# Patient Record
Sex: FEMALE | Race: WHITE | HISPANIC OR LATINO | URBAN - METROPOLITAN AREA
[De-identification: names, ages, dates, MRNs, and addresses within clinical notes are randomized per-mention and may not be internally consistent; named-entity substitution may affect disease eponyms.]

---

## 2020-05-27 ENCOUNTER — EMERGENCY (EMERGENCY)
Facility: HOSPITAL | Age: 45
LOS: 1 days | Discharge: HOME | End: 2020-05-27
Attending: EMERGENCY MEDICINE | Admitting: EMERGENCY MEDICINE
Payer: COMMERCIAL

## 2020-05-27 VITALS
OXYGEN SATURATION: 100 % | RESPIRATION RATE: 16 BRPM | TEMPERATURE: 98 F | HEART RATE: 89 BPM | DIASTOLIC BLOOD PRESSURE: 90 MMHG | SYSTOLIC BLOOD PRESSURE: 146 MMHG

## 2020-05-27 PROCEDURE — 99284 EMERGENCY DEPT VISIT MOD MDM: CPT | Mod: 25

## 2020-05-27 PROCEDURE — 70450 CT HEAD/BRAIN W/O DYE: CPT | Mod: 26

## 2020-05-27 PROCEDURE — 12001 RPR S/N/AX/GEN/TRNK 2.5CM/<: CPT

## 2020-05-27 PROCEDURE — 72125 CT NECK SPINE W/O DYE: CPT | Mod: 26

## 2020-05-27 RX ORDER — ACETAMINOPHEN 500 MG
975 TABLET ORAL ONCE
Refills: 0 | Status: COMPLETED | OUTPATIENT
Start: 2020-05-27 | End: 2020-05-27

## 2020-05-27 RX ORDER — TETANUS TOXOID, REDUCED DIPHTHERIA TOXOID AND ACELLULAR PERTUSSIS VACCINE, ADSORBED 5; 2.5; 8; 8; 2.5 [IU]/.5ML; [IU]/.5ML; UG/.5ML; UG/.5ML; UG/.5ML
0.5 SUSPENSION INTRAMUSCULAR ONCE
Refills: 0 | Status: COMPLETED | OUTPATIENT
Start: 2020-05-27 | End: 2020-05-27

## 2020-05-27 RX ORDER — ONDANSETRON 8 MG/1
4 TABLET, FILM COATED ORAL ONCE
Refills: 0 | Status: COMPLETED | OUTPATIENT
Start: 2020-05-27 | End: 2020-05-27

## 2020-05-27 RX ADMIN — TETANUS TOXOID, REDUCED DIPHTHERIA TOXOID AND ACELLULAR PERTUSSIS VACCINE, ADSORBED 0.5 MILLILITER(S): 5; 2.5; 8; 8; 2.5 SUSPENSION INTRAMUSCULAR at 22:05

## 2020-05-27 RX ADMIN — Medication 975 MILLIGRAM(S): at 22:05

## 2020-05-27 RX ADMIN — ONDANSETRON 4 MILLIGRAM(S): 8 TABLET, FILM COATED ORAL at 23:03

## 2020-05-27 NOTE — ED ADULT TRIAGE NOTE - CHIEF COMPLAINT QUOTE
trip and fall after the shower, patient admits to drinking 3 beers tonight, hit head on bed, laceration present on left side of head, bleeding controlled by pressure. patient denies LOC/ dizziness prior to fall. pt on tamoxifen for breast CA.

## 2020-05-27 NOTE — ED PROVIDER NOTE - ATTENDING CONTRIBUTION TO CARE
MD Peña:  patient seen and evaluated with the resident.  I was present for key portions of the History & Physical, and I agree with the Impression & Plan.  MD Peña:  43 yo F, c/o scalp laceration.  Onset:  1 hr PTA.  Context:  ETOH intox tonight (4 beers), was trying to put on slippers and hit L parietal side of skull.  Associated Sx: no LOC, no HA, no neck pain, no back pain.  no blurry vision/double vision.  VS: wnl.  Physical Exam: adult F, NAD, 1cm laceration R parietal aspect scalp, PERRL, EOMI, neck supple, and w/o midline TTP, RRR, CTA B, Abd: s/nd/nt, Ext: no edema, Neuro: AAOx3, ambulates w/o diff, strength 5/5 & symmetric throughout.  Impression:  head injury + scalp laceration.  injury due to ETOH intox, but calm, cooperative, AAOx3.  Plan:  CT head, wound care.

## 2020-05-27 NOTE — ED PROVIDER NOTE - CARE PLAN
Principal Discharge DX:	Scalp laceration, initial encounter  Goal:	1.5cm, linear Principal Discharge DX:	Scalp laceration, initial encounter  Goal:	1.5cm, linear  Secondary Diagnosis:	Thyroid nodule

## 2020-05-27 NOTE — ED PROVIDER NOTE - NSFOLLOWUPINSTRUCTIONS_ED_ALL_ED_FT
YOU WERE SEEN IN THE ED FOR: SCALP LACERATION    PLEASE RETURN TO THE ED OR SEE YOUR PRIMARY DOCTOR IN 7 DAYS TO HAVE THE WOUND CHECKED AND STAPLES REMOVED.    FOR PAIN/FEVER, YOU MAY TAKE TYLENOL (acetaminophen). FOLLOW THE INSTRUCTIONS ON THE LABEL/CONTAINER.    PLEASE FOLLOW UP WITH YOUR PRIVATE PHYSICIAN WITHIN THE NEXT 72 HOURS. BRING COPIES OF YOUR RESULTS.  -If you do not have a PMD, please call [phone] to find one convenient for you or call our clinic at [phone].    RETURN TO THE EMERGENCY DEPARTMENT IF YOU EXPERIENCE ANY NEW/CONCERNING/WORSENING SYMPTOMS. YOU WERE SEEN IN THE ED FOR: SCALP LACERATION    YOU WERE FOUND TO HAVE AN INCIDENTAL THYROID NODULE. PLEASE FOLLOW UP WITH YOUR PRIMARY DOCTOR ABOUT THIS.    PLEASE RETURN TO THE ED OR SEE YOUR PRIMARY DOCTOR IN 7 DAYS TO HAVE THE WOUND CHECKED AND STAPLES REMOVED.    FOR PAIN/FEVER, YOU MAY TAKE TYLENOL (acetaminophen). FOLLOW THE INSTRUCTIONS ON THE LABEL/CONTAINER.    PLEASE FOLLOW UP WITH YOUR PRIVATE PHYSICIAN WITHIN THE NEXT 72 HOURS. BRING COPIES OF YOUR RESULTS.  -If you do not have a PMD, please call [phone] to find one convenient for you or call our clinic at [phone].    RETURN TO THE EMERGENCY DEPARTMENT IF YOU EXPERIENCE ANY NEW/CONCERNING/WORSENING SYMPTOMS.

## 2020-05-27 NOTE — ED PROVIDER NOTE - PATIENT PORTAL LINK FT
You can access the FollowMyHealth Patient Portal offered by [facility] by registering at the following website: [email or web address]. By joining [facility]’s FollowMyHealth portal, you will also be able to view your health information using other applications (apps) compatible with our system.

## 2020-05-27 NOTE — ED PROVIDER NOTE - OBJECTIVE STATEMENT
44 y.o. female hx of breast cancer on tamoxifen presents to the ED s/p unwitnessed mechanical fall 1hr PTA. Patient got out of her shower, walked to her bedroom, tried to put on her crocs and slipped. She fell backwards, and the hit the left side of her head on the ground. Denies LOC. Not on any anticoagulants, antiplatelets. Denies feeling dizzy, nauseous, CP, SOB numbness, tingling, weakness before the incident. Patient was able to stand up and walk afterwards. Patient endorses drinking 4 Corona's before the fall. Does not regularly drink alcohol. Not sure when last tetanus shot was.

## 2020-05-27 NOTE — ED PROVIDER NOTE - PROGRESS NOTE DETAILS
Yusuf Johsnton D.O., PGY1 (Resident)  Wound closed with 2 staples after copious irrigation and wound exploration in bloodless field. Hemostasis achieved.

## 2020-05-27 NOTE — ED PROVIDER NOTE - CLINICAL SUMMARY MEDICAL DECISION MAKING FREE TEXT BOX
Impression:  head injury + scalp laceration.  injury due to ETOH intox, but calm, cooperative, AAOx3.  Plan:  CT head, wound care.

## 2020-05-27 NOTE — ED PROVIDER NOTE - PHYSICAL EXAMINATION
GENERAL: middle aged female, lying in bed, NAD, active bleeding from scalp lac that was controlled with pressure bandage. Vital signs are within normal limits  HEENT: NC, 1.5cm linear scalp lac w/o exposed bone or foreign bodies. conjunctiva noninjected and sclera anicteric, moist mucous membranes,  NECK: Supple, trachea midline  LUNG: CTAB, no w/r/r appreciated  CV: RRR, no m/r/g appreciated, Pulses- Radial: 2+ b/l  ABDOMEN: Soft, NTND  BACK: No midline spinal tenderness or step-offs. No paraspinal tenderness to palpation  MSK: No edema, no visible deformities, full range of motion UE/LE b/l, 5/5 muscle strength UE/LE b/l, nontender extremities  NEURO: AAOx4 (to person, place, time, event), sensation grossly intact, finger-to-nose smooth and rapid, no pronator drift, steady gait  -Cranial Nerves:  --CN II: PERRL  --CN III, IV, VI: EOMI b/l  --CN V1-3: Facial sensation intact to touch  --CN VII: No facial asymmetry or droop  --CN VIII: Hearing intact to rubbing fingers b/l  --CN IX, X: Palate elevates symmetrically. Normal phonation  --CN XI: Heading turning and shoulder shrug intact b/l  --CN XII: Tongue midline with normal movements   SKIN: Warm, dry, well perfused, no evidence of rash  PSYCH: Normal mood and affect

## 2020-05-27 NOTE — ED ADULT NURSE NOTE - OBJECTIVE STATEMENT
Received pt in spot 22. AA0X3. C/o head laceration s/p mechanical fall tonight. Pt states she hit her head on the bed. Denies LOC. Bleeding controlled at present as MD previously wrapped laceration with gauze prior to RN assessment. Pt denies blood thinner use. Pt endorses drinking "4 coors lights tonight". Awaiting further orders. Will continue to monitor.

## 2020-05-27 NOTE — ED PROCEDURE NOTE - PROCEDURE ADDITIONAL DETAILS
1.5cm laceration on left parietal region of scalp prepped in sterile fashion, irrigated with copious sterile water. Site numbed using 3mL lidocaine with epinephrine. 2 staples placed. Hemostasis achieved with excellent reapproximation of wound edges.

## 2020-05-27 NOTE — ED ADULT NURSE NOTE - NSIMPLEMENTINTERV_GEN_ALL_ED
Implemented All Fall Risk Interventions:  [place] to call system. Call bell, personal items and telephone within reach. Instruct patient to call for assistance. Room bathroom lighting operational. Non-slip footwear when patient is off stretcher. Physically safe environment: no spills, clutter or unnecessary equipment. Stretcher in lowest position, wheels locked, appropriate side rails in place. Provide visual cue, wrist band, yellow gown, etc. Monitor gait and stability. Monitor for mental status changes and reorient to person, place, and time. Review medications for side effects contributing to fall risk. Reinforce activity limits and safety measures with patient and family.

## 2021-09-01 PROBLEM — C50.919 MALIGNANT NEOPLASM OF UNSPECIFIED SITE OF UNSPECIFIED FEMALE BREAST: Chronic | Status: ACTIVE | Noted: 2020-05-27

## 2021-11-18 ENCOUNTER — APPOINTMENT (OUTPATIENT)
Dept: DERMATOLOGY | Facility: CLINIC | Age: 46
End: 2021-11-18
Payer: COMMERCIAL

## 2021-11-18 ENCOUNTER — APPOINTMENT (OUTPATIENT)
Dept: DERMATOLOGY | Facility: CLINIC | Age: 46
End: 2021-11-18

## 2021-11-18 VITALS — BODY MASS INDEX: 30.82 KG/M2 | HEIGHT: 60 IN | WEIGHT: 157 LBS

## 2021-11-18 DIAGNOSIS — L30.8 OTHER SPECIFIED DERMATITIS: ICD-10-CM

## 2021-11-18 PROBLEM — Z00.00 ENCOUNTER FOR PREVENTIVE HEALTH EXAMINATION: Status: ACTIVE | Noted: 2021-11-18

## 2021-11-18 PROCEDURE — 99204 OFFICE O/P NEW MOD 45 MIN: CPT

## 2021-11-18 RX ORDER — HYDROCORTISONE VALERATE 2 MG/G
0.2 OINTMENT TOPICAL
Qty: 1 | Refills: 0 | Status: ACTIVE | COMMUNITY
Start: 2021-11-18 | End: 1900-01-01

## 2021-11-24 NOTE — HISTORY OF PRESENT ILLNESS
[FreeTextEntry1] : dry, cracked lips [de-identified] :  ID# [id number]\par \par Patient reports 3 months of constantly dry lips. She has tried applying Vaseline but not achieved much relief. She also reports that a few weeks ago she had a small, fluid-filled blister on the perilabial skin, near the L corner. It was not painful. She tried using Abreva. The lesion has now resolved. She had a similar lesion present 2 years ago that resolved on its own.

## 2021-11-24 NOTE — ASSESSMENT
[FreeTextEntry1] : # labial dermatitis\par - start hydrocortisone valerate 0.2%\par - advised patient that if symptoms resolve, there is no need to return to clinic\par - however, if symptoms do not resolve, advised patient to return to clinic for skin patch testing for possible allergic reaction

## 2021-11-24 NOTE — PHYSICAL EXAM
[Alert] : alert [Oriented x 3] : ~L oriented x 3 [Well Nourished] : well nourished [Conjunctiva Non-injected] : conjunctiva non-injected [No Visual Lymphadenopathy] : no visual  lymphadenopathy [No Clubbing] : no clubbing [No Bromhidrosis] : no bromhidrosis [No Chromhidrosis] : no chromhidrosis [FreeTextEntry3] : The face was examined. There is prominent edema of the inferior labia with overlying fissures and dry skin. The perilabial skin is normal in appearance. The inferior gums are normal in appearance.

## 2022-01-20 ENCOUNTER — APPOINTMENT (OUTPATIENT)
Dept: DERMATOLOGY | Facility: CLINIC | Age: 47
End: 2022-01-20

## 2022-03-28 NOTE — ED PROVIDER NOTE - NS ED ROS FT
CONSTITUTIONAL: No fevers, chills, fatigue, dizziness, lightheadedness weakness  HEENT: No loss of vision, double vision, blurry vision, nasal congestion, runny nose, sore throat  CV: No chest pain, palpitations  PULM: No cough, shortness of breath  GI: No abdominal pain, nausea, vomiting, diarrhea  : No dysuria, polyuria, hematuria  SKIN: SCALP LACERATION. No rashes, swelling  MSK: No muscle aches, joint aches  NEURO: no headache, paresthesias [Initial] : an initial consultation for [FreeTextEntry2] : annual

## 2024-03-11 NOTE — ED PROCEDURE NOTE - CPROC ED POST PROC CARE GUIDE1
Instructed patient/caregiver to follow-up with primary care physician./Keep the cast/splint/dressing clean and dry./Verbal/written post procedure instructions were given to patient/caregiver./Instructed patient/caregiver regarding signs and symptoms of infection. 27.2